# Patient Record
(demographics unavailable — no encounter records)

---

## 2025-07-29 NOTE — HISTORY OF PRESENT ILLNESS
[de-identified] : Abundio presents for a follow-up for chronic sinusitis with nasal polyps. He has been receiving Dupixent Q2 weeks at home and has been doing very well. His sense of smell has returned, no nasal congestion and no recent sinus infections. Abundio has not had any issues with self-administration either. Presently, he is feeling well. Abundio had been on Nucala previously for nasal polyps. It worked well for 6 months and then was no longer effective.

## 2025-07-29 NOTE — ASSESSMENT
[FreeTextEntry1] : Chronic sinusitis with nasal polyps in good control Continue Dupixent 300mg Q2 weeks at home.